# Patient Record
Sex: FEMALE | Race: WHITE | Employment: UNEMPLOYED | URBAN - METROPOLITAN AREA
[De-identification: names, ages, dates, MRNs, and addresses within clinical notes are randomized per-mention and may not be internally consistent; named-entity substitution may affect disease eponyms.]

---

## 2018-05-29 ENCOUNTER — TELEPHONE (OUTPATIENT)
Dept: CARDIAC REHAB | Age: 78
End: 2018-05-29

## 2018-05-29 NOTE — TELEPHONE ENCOUNTER
Pt called to reschedule her outpatient cardiac rehab intake appt. Pt had pacemaker inserted at [facility] on 2/15/18. A rash developed which could not be explained. Pacemaker  was contacted and a rash was a rare side effect of pacemaker and/or lead. Pt is scheduled for testing for pacemaker and/or lead allergy at [facility] on 6/5/18, 6/7/18 and 6/11/18. Pt was rescheduled for cardiac rehab intake on July 27th at 9:00 am. (but will be put on waiting list for intake sooner when pt gets approval to start rehab after above scheduled testing).

## 2018-07-26 RX ORDER — PREDNISONE 5 MG/1
5 TABLET ORAL
COMMUNITY

## 2018-07-26 RX ORDER — FUROSEMIDE 20 MG/1
20 TABLET ORAL AS NEEDED
COMMUNITY

## 2018-07-26 RX ORDER — TRIAMCINOLONE ACETONIDE 1 MG/G
OINTMENT TOPICAL 2 TIMES DAILY
COMMUNITY

## 2018-07-26 RX ORDER — FLUTICASONE PROPIONATE 110 UG/1
1 AEROSOL, METERED RESPIRATORY (INHALATION)
COMMUNITY
End: 2018-09-04

## 2018-07-26 RX ORDER — HYDROXYZINE PAMOATE 25 MG/1
25 CAPSULE ORAL
COMMUNITY

## 2018-07-27 ENCOUNTER — HOSPITAL ENCOUNTER (OUTPATIENT)
Dept: CARDIAC REHAB | Age: 78
Discharge: HOME | End: 2018-07-27
Payer: MEDICARE

## 2018-07-27 VITALS — HEIGHT: 66 IN | WEIGHT: 179.2 LBS | BODY MASS INDEX: 28.8 KG/M2

## 2018-07-27 PROCEDURE — G0424 PULMONARY REHAB W EXER: HCPCS

## 2018-07-27 NOTE — CARDIO/PULMONARY
Pulmonary Rehab Orientation:  
Patient is a 69year old female patient referred to  Pulmonary rehab by Dr. Rebecca Sanders due to diagnosis of COPD. Patient's prior medical history consists of :  
1. Rheumatoid Arthritis 2. A-fib 3. Pacemaker 4. Thyroid 5. Htn Former smoker quit Jan 2017 Immunizations reviewed and noted to be up to date. Exercise includes none at present Patient limitations 2 l o2 n/c Patient given overview of Pulmonary Rehab program, placement of electrodes/leads and rationale for program. Viewed [facility] Rehab DVD and note book given along with schedule of classes. Pt reported goals are: 1. Complete Pulmonary Rehab 2. Decrease need for oxygen Most doctors are at [facility] Lives alone outside of [place] and has never worked. Has a daughter in [place] and a son in [place]. Stressors are none but does worry with frequent power outages in her area and trying to get her pacemaker and meds straight as after procedure she had itching and MD trying to find out why.  
First exercise scheduled for Tues July 31 at 10:30

## 2018-07-27 NOTE — CARDIO/PULMONARY
COPD Assessment Tool (CAT)     0-5 I never cough/I cough all the time       Score:1 I have no phlegm in my chest/ My chest is completely full of phlegm  Score:2 My chest does not feel tight at all/ My chest feels very tight   Score:1 When I walk up a hill or stairs I am bot breathless/ When I walk up a hill or stairs I am very breathless    Score:2 I am not limited doing any activities at home/ 
 I am very limited doing activities at home     Score:1 I am confident leaving my home despite my lung condition/ 
I am not at all confident leaving my home because of my lung condition Score:1 I sleep soundly/ I don't sleep because of my lung condition   Score:1 I have lots of energy/ I have no energy at all     Score:3            Total:12

## 2018-07-31 ENCOUNTER — HOSPITAL ENCOUNTER (OUTPATIENT)
Dept: CARDIAC REHAB | Age: 78
Discharge: HOME | End: 2018-07-31
Payer: MEDICARE

## 2018-07-31 VITALS — BODY MASS INDEX: 28.86 KG/M2 | WEIGHT: 178.8 LBS

## 2018-07-31 PROCEDURE — G0424 PULMONARY REHAB W EXER: HCPCS

## 2018-08-07 ENCOUNTER — HOSPITAL ENCOUNTER (OUTPATIENT)
Dept: CARDIAC REHAB | Age: 78
End: 2018-08-07
Payer: MEDICARE

## 2018-08-09 ENCOUNTER — HOSPITAL ENCOUNTER (OUTPATIENT)
Dept: CARDIAC REHAB | Age: 78
Discharge: HOME | End: 2018-08-09
Payer: MEDICARE

## 2018-08-09 VITALS — WEIGHT: 180.4 LBS | BODY MASS INDEX: 29.12 KG/M2

## 2018-08-09 PROCEDURE — G0424 PULMONARY REHAB W EXER: HCPCS

## 2018-08-14 ENCOUNTER — HOSPITAL ENCOUNTER (OUTPATIENT)
Dept: CARDIAC REHAB | Age: 78
Discharge: HOME | End: 2018-08-14
Payer: MEDICARE

## 2018-08-21 ENCOUNTER — APPOINTMENT (OUTPATIENT)
Dept: CARDIAC REHAB | Age: 78
End: 2018-08-21
Payer: MEDICARE

## 2018-08-23 ENCOUNTER — HOSPITAL ENCOUNTER (OUTPATIENT)
Dept: CARDIAC REHAB | Age: 78
Discharge: HOME | End: 2018-08-23
Payer: MEDICARE

## 2018-08-23 VITALS — WEIGHT: 176 LBS | BODY MASS INDEX: 28.41 KG/M2

## 2018-08-23 PROCEDURE — G0424 PULMONARY REHAB W EXER: HCPCS

## 2018-08-28 ENCOUNTER — HOSPITAL ENCOUNTER (OUTPATIENT)
Dept: CARDIAC REHAB | Age: 78
Discharge: HOME | End: 2018-08-28
Payer: MEDICARE

## 2018-08-28 VITALS — WEIGHT: 174.2 LBS | BODY MASS INDEX: 28.12 KG/M2

## 2018-08-28 PROCEDURE — G0424 PULMONARY REHAB W EXER: HCPCS

## 2018-09-04 ENCOUNTER — HOSPITAL ENCOUNTER (OUTPATIENT)
Dept: CARDIAC REHAB | Age: 78
Discharge: HOME | End: 2018-09-04
Payer: MEDICARE

## 2018-09-04 VITALS — BODY MASS INDEX: 28.13 KG/M2 | WEIGHT: 174.3 LBS

## 2018-09-04 PROCEDURE — G0424 PULMONARY REHAB W EXER: HCPCS

## 2018-09-04 RX ORDER — IRBESARTAN 75 MG/1
75 TABLET ORAL
COMMUNITY

## 2018-09-04 RX ORDER — HYDROCHLOROTHIAZIDE 25 MG/1
25 TABLET ORAL DAILY
COMMUNITY

## 2018-09-04 RX ORDER — FLUTICASONE PROPIONATE 220 UG/1
AEROSOL, METERED RESPIRATORY (INHALATION)
COMMUNITY

## 2018-09-18 ENCOUNTER — HOSPITAL ENCOUNTER (OUTPATIENT)
Dept: CARDIAC REHAB | Age: 78
Discharge: HOME | End: 2018-09-18
Payer: MEDICARE

## 2018-09-18 VITALS — WEIGHT: 181 LBS | BODY MASS INDEX: 29.21 KG/M2

## 2018-09-18 PROCEDURE — G0424 PULMONARY REHAB W EXER: HCPCS

## 2018-09-18 NOTE — CARDIO/PULMONARY
CP REHAB Patient came in today with her weight up 7 pounds since September 4, 2018. She states she takes lasix PRN but her prescription ran out. She denies SOB, orthopnea, abdominal distention, or cough. Lung sounds clear but diminished with trace edema in ankles bilaterally. Patient states she knows when she needs to take a lasix and  feels like she could probably use one. Patient instructed to call her doctor when she gets home and get her prescription refilled today. Patient understood and confirmed she will call doctor for a refill when she gets home.

## 2018-09-20 ENCOUNTER — HOSPITAL ENCOUNTER (OUTPATIENT)
Dept: CARDIAC REHAB | Age: 78
Discharge: HOME | End: 2018-09-20
Payer: MEDICARE

## 2018-09-20 VITALS — BODY MASS INDEX: 28.73 KG/M2 | WEIGHT: 178 LBS

## 2018-09-20 PROCEDURE — G0424 PULMONARY REHAB W EXER: HCPCS

## 2018-09-25 ENCOUNTER — HOSPITAL ENCOUNTER (OUTPATIENT)
Dept: CARDIAC REHAB | Age: 78
Discharge: HOME | End: 2018-09-25
Payer: MEDICARE

## 2018-09-25 VITALS — WEIGHT: 179 LBS | BODY MASS INDEX: 28.89 KG/M2

## 2018-09-25 PROCEDURE — G0424 PULMONARY REHAB W EXER: HCPCS

## 2018-09-27 ENCOUNTER — APPOINTMENT (OUTPATIENT)
Dept: CARDIAC REHAB | Age: 78
End: 2018-09-27
Payer: MEDICARE

## 2018-10-02 ENCOUNTER — APPOINTMENT (OUTPATIENT)
Dept: CARDIAC REHAB | Age: 78
End: 2018-10-02
Payer: MEDICARE

## 2018-10-04 ENCOUNTER — HOSPITAL ENCOUNTER (OUTPATIENT)
Dept: CARDIAC REHAB | Age: 78
Discharge: HOME | End: 2018-10-04
Payer: MEDICARE

## 2018-10-04 VITALS — BODY MASS INDEX: 28.89 KG/M2 | WEIGHT: 179 LBS

## 2018-10-04 PROCEDURE — G0424 PULMONARY REHAB W EXER: HCPCS

## 2018-10-09 ENCOUNTER — HOSPITAL ENCOUNTER (OUTPATIENT)
Dept: CARDIAC REHAB | Age: 78
Discharge: HOME | End: 2018-10-09
Payer: MEDICARE

## 2018-10-09 VITALS — BODY MASS INDEX: 28.73 KG/M2 | WEIGHT: 178 LBS

## 2018-10-09 PROCEDURE — G0424 PULMONARY REHAB W EXER: HCPCS

## 2018-10-18 ENCOUNTER — HOSPITAL ENCOUNTER (OUTPATIENT)
Dept: CARDIAC REHAB | Age: 78
Discharge: HOME | End: 2018-10-18
Payer: MEDICARE

## 2018-10-25 ENCOUNTER — HOSPITAL ENCOUNTER (OUTPATIENT)
Dept: CARDIAC REHAB | Age: 78
Discharge: HOME | End: 2018-10-25
Payer: MEDICARE

## 2018-10-25 VITALS — WEIGHT: 178 LBS | BODY MASS INDEX: 28.73 KG/M2

## 2018-10-25 PROCEDURE — G0424 PULMONARY REHAB W EXER: HCPCS

## 2018-11-01 ENCOUNTER — HOSPITAL ENCOUNTER (OUTPATIENT)
Dept: CARDIAC REHAB | Age: 78
Discharge: HOME | End: 2018-11-01
Payer: MEDICARE

## 2018-11-01 VITALS — WEIGHT: 177 LBS | BODY MASS INDEX: 28.57 KG/M2

## 2018-11-06 ENCOUNTER — HOSPITAL ENCOUNTER (OUTPATIENT)
Dept: CARDIAC REHAB | Age: 78
Discharge: HOME | End: 2018-11-06
Payer: MEDICARE

## 2018-11-06 VITALS — BODY MASS INDEX: 28.89 KG/M2 | WEIGHT: 179 LBS

## 2018-11-06 PROCEDURE — G0424 PULMONARY REHAB W EXER: HCPCS

## 2018-11-13 ENCOUNTER — HOSPITAL ENCOUNTER (OUTPATIENT)
Dept: CARDIAC REHAB | Age: 78
Discharge: HOME | End: 2018-11-13
Payer: MEDICARE

## 2018-11-13 VITALS — BODY MASS INDEX: 28.33 KG/M2 | WEIGHT: 175.5 LBS

## 2018-11-13 PROCEDURE — G0424 PULMONARY REHAB W EXER: HCPCS

## 2018-11-20 ENCOUNTER — HOSPITAL ENCOUNTER (OUTPATIENT)
Dept: CARDIAC REHAB | Age: 78
Discharge: HOME | End: 2018-11-20
Payer: MEDICARE

## 2018-11-20 VITALS — WEIGHT: 176 LBS | BODY MASS INDEX: 28.41 KG/M2

## 2018-11-20 PROCEDURE — G0424 PULMONARY REHAB W EXER: HCPCS

## 2018-11-29 ENCOUNTER — HOSPITAL ENCOUNTER (OUTPATIENT)
Dept: CARDIAC REHAB | Age: 78
Discharge: HOME | End: 2018-11-29
Payer: MEDICARE

## 2018-11-29 VITALS — WEIGHT: 180 LBS | BODY MASS INDEX: 29.05 KG/M2

## 2018-11-29 PROCEDURE — G0424 PULMONARY REHAB W EXER: HCPCS

## 2018-12-04 ENCOUNTER — HOSPITAL ENCOUNTER (OUTPATIENT)
Dept: CARDIAC REHAB | Age: 78
Discharge: HOME | End: 2018-12-04
Payer: MEDICARE

## 2018-12-04 VITALS — BODY MASS INDEX: 28.73 KG/M2 | WEIGHT: 178 LBS

## 2018-12-04 PROCEDURE — G0424 PULMONARY REHAB W EXER: HCPCS

## 2018-12-11 ENCOUNTER — APPOINTMENT (OUTPATIENT)
Dept: CARDIAC REHAB | Age: 78
End: 2018-12-11
Payer: MEDICARE

## 2018-12-13 ENCOUNTER — HOSPITAL ENCOUNTER (OUTPATIENT)
Dept: CARDIAC REHAB | Age: 78
Discharge: HOME | End: 2018-12-13
Payer: MEDICARE

## 2018-12-13 VITALS — WEIGHT: 178 LBS | BODY MASS INDEX: 28.73 KG/M2

## 2018-12-13 PROCEDURE — G0424 PULMONARY REHAB W EXER: HCPCS

## 2018-12-18 ENCOUNTER — HOSPITAL ENCOUNTER (OUTPATIENT)
Dept: CARDIAC REHAB | Age: 78
Discharge: HOME | End: 2018-12-18
Payer: MEDICARE

## 2018-12-18 PROCEDURE — G0424 PULMONARY REHAB W EXER: HCPCS

## 2018-12-20 ENCOUNTER — APPOINTMENT (OUTPATIENT)
Dept: CARDIAC REHAB | Age: 78
End: 2018-12-20
Payer: MEDICARE

## 2019-01-08 ENCOUNTER — HOSPITAL ENCOUNTER (OUTPATIENT)
Dept: CARDIAC REHAB | Age: 79
Discharge: HOME | End: 2019-01-08
Payer: MEDICARE

## 2019-01-08 VITALS — WEIGHT: 179 LBS | BODY MASS INDEX: 28.89 KG/M2

## 2019-01-08 PROCEDURE — G0424 PULMONARY REHAB W EXER: HCPCS

## 2019-01-10 ENCOUNTER — HOSPITAL ENCOUNTER (OUTPATIENT)
Dept: CARDIAC REHAB | Age: 79
Discharge: HOME | End: 2019-01-10
Payer: MEDICARE

## 2019-01-10 VITALS — BODY MASS INDEX: 29.38 KG/M2 | WEIGHT: 182 LBS

## 2019-01-10 PROCEDURE — G0424 PULMONARY REHAB W EXER: HCPCS

## 2019-01-15 ENCOUNTER — APPOINTMENT (OUTPATIENT)
Dept: CARDIAC REHAB | Age: 79
End: 2019-01-15
Payer: MEDICARE

## 2019-01-17 ENCOUNTER — HOSPITAL ENCOUNTER (OUTPATIENT)
Dept: CARDIAC REHAB | Age: 79
End: 2019-01-17
Payer: MEDICARE

## 2019-01-22 ENCOUNTER — HOSPITAL ENCOUNTER (OUTPATIENT)
Dept: CARDIAC REHAB | Age: 79
End: 2019-01-22
Payer: MEDICARE

## 2019-01-24 ENCOUNTER — APPOINTMENT (OUTPATIENT)
Dept: CARDIAC REHAB | Age: 79
End: 2019-01-24
Payer: MEDICARE

## 2019-01-29 ENCOUNTER — HOSPITAL ENCOUNTER (OUTPATIENT)
Dept: CARDIAC REHAB | Age: 79
Discharge: HOME | End: 2019-01-29
Payer: MEDICARE

## 2019-01-29 VITALS — BODY MASS INDEX: 28.73 KG/M2 | WEIGHT: 178 LBS

## 2019-01-29 PROCEDURE — G0424 PULMONARY REHAB W EXER: HCPCS

## 2019-01-31 ENCOUNTER — APPOINTMENT (OUTPATIENT)
Dept: CARDIAC REHAB | Age: 79
End: 2019-01-31
Payer: MEDICARE

## 2019-02-05 ENCOUNTER — APPOINTMENT (OUTPATIENT)
Dept: CARDIAC REHAB | Age: 79
End: 2019-02-05
Payer: MEDICARE

## 2019-02-07 ENCOUNTER — HOSPITAL ENCOUNTER (OUTPATIENT)
Dept: CARDIAC REHAB | Age: 79
Discharge: HOME | End: 2019-02-07
Payer: MEDICARE

## 2019-02-07 VITALS — WEIGHT: 177 LBS | BODY MASS INDEX: 28.57 KG/M2

## 2019-02-07 PROCEDURE — 93798 PHYS/QHP OP CAR RHAB W/ECG: CPT

## 2019-02-07 PROCEDURE — G0424 PULMONARY REHAB W EXER: HCPCS

## 2019-02-14 ENCOUNTER — APPOINTMENT (OUTPATIENT)
Dept: CARDIAC REHAB | Age: 79
End: 2019-02-14
Payer: MEDICARE

## 2019-02-19 ENCOUNTER — APPOINTMENT (OUTPATIENT)
Dept: CARDIAC REHAB | Age: 79
End: 2019-02-19
Payer: MEDICARE

## 2019-02-21 ENCOUNTER — APPOINTMENT (OUTPATIENT)
Dept: CARDIAC REHAB | Age: 79
End: 2019-02-21
Payer: MEDICARE

## 2019-03-05 ENCOUNTER — HOSPITAL ENCOUNTER (OUTPATIENT)
Dept: CARDIAC REHAB | Age: 79
Discharge: HOME | End: 2019-03-05

## 2019-03-05 NOTE — CARDIO/PULMONARY
Pt returned my phone call, pt to have another eye surgery on 3-14-19. Pt hope to return in about 2 weeks.